# Patient Record
Sex: FEMALE | Race: WHITE | NOT HISPANIC OR LATINO | Employment: OTHER
[De-identification: names, ages, dates, MRNs, and addresses within clinical notes are randomized per-mention and may not be internally consistent; named-entity substitution may affect disease eponyms.]

---

## 2017-05-17 ENCOUNTER — RECORDS - HEALTHEAST (OUTPATIENT)
Dept: ADMINISTRATIVE | Facility: OTHER | Age: 67
End: 2017-05-17

## 2017-05-17 LAB
LAB AP CHARGES (HE HISTORICAL CONVERSION): NORMAL
PATH REPORT.COMMENTS IMP SPEC: NORMAL
PATH REPORT.FINAL DX SPEC: NORMAL
PATH REPORT.GROSS SPEC: NORMAL
PATH REPORT.MICROSCOPIC SPEC OTHER STN: NORMAL
PATH REPORT.RELEVANT HX SPEC: NORMAL
RESULT FLAG (HE HISTORICAL CONVERSION): NORMAL

## 2017-12-18 ENCOUNTER — RECORDS - HEALTHEAST (OUTPATIENT)
Dept: LAB | Facility: CLINIC | Age: 67
End: 2017-12-18

## 2017-12-18 LAB
CHOLEST SERPL-MCNC: 182 MG/DL
FASTING STATUS PATIENT QL REPORTED: YES
HDLC SERPL-MCNC: 70 MG/DL
LDLC SERPL CALC-MCNC: 97 MG/DL
TRIGL SERPL-MCNC: 77 MG/DL

## 2018-10-12 ENCOUNTER — AMBULATORY - HEALTHEAST (OUTPATIENT)
Dept: ONCOLOGY | Facility: CLINIC | Age: 68
End: 2018-10-12

## 2018-10-12 DIAGNOSIS — G89.29 CHRONIC LOW BACK PAIN WITH SCIATICA: ICD-10-CM

## 2018-10-12 DIAGNOSIS — M54.40 CHRONIC LOW BACK PAIN WITH SCIATICA: ICD-10-CM

## 2018-12-26 ENCOUNTER — RECORDS - HEALTHEAST (OUTPATIENT)
Dept: LAB | Facility: CLINIC | Age: 68
End: 2018-12-26

## 2018-12-26 LAB
ANION GAP SERPL CALCULATED.3IONS-SCNC: 6 MMOL/L (ref 5–18)
BUN SERPL-MCNC: 17 MG/DL (ref 8–28)
CALCIUM SERPL-MCNC: 9.7 MG/DL (ref 8.5–10.5)
CHLORIDE BLD-SCNC: 101 MMOL/L (ref 98–107)
CHOLEST SERPL-MCNC: 255 MG/DL
CO2 SERPL-SCNC: 32 MMOL/L (ref 22–31)
CREAT SERPL-MCNC: 0.82 MG/DL (ref 0.6–1.1)
FASTING STATUS PATIENT QL REPORTED: YES
GFR SERPL CREATININE-BSD FRML MDRD: >60 ML/MIN/1.73M2
GLUCOSE BLD-MCNC: 81 MG/DL (ref 70–125)
HDLC SERPL-MCNC: 74 MG/DL
LDLC SERPL CALC-MCNC: 169 MG/DL
POTASSIUM BLD-SCNC: 3.8 MMOL/L (ref 3.5–5)
SODIUM SERPL-SCNC: 139 MMOL/L (ref 136–145)
TRIGL SERPL-MCNC: 61 MG/DL
TSH SERPL DL<=0.005 MIU/L-ACNC: 3.09 UIU/ML (ref 0.3–5)

## 2019-07-02 ENCOUNTER — RECORDS - HEALTHEAST (OUTPATIENT)
Dept: LAB | Facility: CLINIC | Age: 69
End: 2019-07-02

## 2019-07-02 LAB — TSH SERPL DL<=0.005 MIU/L-ACNC: 0.72 UIU/ML (ref 0.3–5)

## 2020-01-03 ENCOUNTER — RECORDS - HEALTHEAST (OUTPATIENT)
Dept: LAB | Facility: CLINIC | Age: 70
End: 2020-01-03

## 2020-01-03 LAB
ANION GAP SERPL CALCULATED.3IONS-SCNC: 10 MMOL/L (ref 5–18)
BUN SERPL-MCNC: 16 MG/DL (ref 8–28)
CALCIUM SERPL-MCNC: 9.7 MG/DL (ref 8.5–10.5)
CHLORIDE BLD-SCNC: 100 MMOL/L (ref 98–107)
CHOLEST SERPL-MCNC: 177 MG/DL
CO2 SERPL-SCNC: 29 MMOL/L (ref 22–31)
CREAT SERPL-MCNC: 0.82 MG/DL (ref 0.6–1.1)
FASTING STATUS PATIENT QL REPORTED: NORMAL
GFR SERPL CREATININE-BSD FRML MDRD: >60 ML/MIN/1.73M2
GLUCOSE BLD-MCNC: 83 MG/DL (ref 70–125)
HDLC SERPL-MCNC: 74 MG/DL
LDLC SERPL CALC-MCNC: 93 MG/DL
POTASSIUM BLD-SCNC: 3.7 MMOL/L (ref 3.5–5)
SODIUM SERPL-SCNC: 139 MMOL/L (ref 136–145)
TRIGL SERPL-MCNC: 49 MG/DL
TSH SERPL DL<=0.005 MIU/L-ACNC: 0.7 UIU/ML (ref 0.3–5)

## 2021-01-04 ENCOUNTER — RECORDS - HEALTHEAST (OUTPATIENT)
Dept: LAB | Facility: CLINIC | Age: 71
End: 2021-01-04

## 2021-01-04 LAB
ANION GAP SERPL CALCULATED.3IONS-SCNC: 12 MMOL/L (ref 5–18)
BUN SERPL-MCNC: 16 MG/DL (ref 8–28)
CALCIUM SERPL-MCNC: 9.5 MG/DL (ref 8.5–10.5)
CHLORIDE BLD-SCNC: 100 MMOL/L (ref 98–107)
CHOLEST SERPL-MCNC: 185 MG/DL
CO2 SERPL-SCNC: 28 MMOL/L (ref 22–31)
CREAT SERPL-MCNC: 0.82 MG/DL (ref 0.6–1.1)
FASTING STATUS PATIENT QL REPORTED: NORMAL
GFR SERPL CREATININE-BSD FRML MDRD: >60 ML/MIN/1.73M2
GLUCOSE BLD-MCNC: 79 MG/DL (ref 70–125)
HDLC SERPL-MCNC: 72 MG/DL
LDLC SERPL CALC-MCNC: 100 MG/DL
POTASSIUM BLD-SCNC: 4.1 MMOL/L (ref 3.5–5)
SODIUM SERPL-SCNC: 140 MMOL/L (ref 136–145)
TRIGL SERPL-MCNC: 65 MG/DL
TSH SERPL DL<=0.005 MIU/L-ACNC: 2.51 UIU/ML (ref 0.3–5)

## 2021-06-21 NOTE — PROGRESS NOTES
"ACUPUNCTURIST TREATMENT NOTE    Name: Fallon Perez  :  1950  MRN:  [id number]      Acupuncture Treatment  Patient Type: [facility]  Intervention Reason: Pain  Pre-session Pain Ratin  Post-session Pain Ratin  Patient complaint:: chronic back pain  Acupuncture (Points):: Du 20, Yin Baca, LI 10, SI 3, LI 4, St 36, GB 34, Fouzia 3, GB 41, BL 62  Practitioner Observed: 30 minute treatment  Checklist: Progress Note Completed;Consent Reveiwed  Follow up comments: Pt. was apprehensive about having acupuncture local at her back today so we started with a distal approach    \"Risks and benefits of acupuncture were discussed with patient. Consent for treatment was given. We thank you for the referral.\"     Cyndi Ponce L.Ac.    Date:  10/12/2018  Time:  5:08 PM    "

## 2022-01-14 ENCOUNTER — LAB REQUISITION (OUTPATIENT)
Dept: LAB | Facility: CLINIC | Age: 72
End: 2022-01-14

## 2022-01-14 DIAGNOSIS — E78.00 PURE HYPERCHOLESTEROLEMIA, UNSPECIFIED: ICD-10-CM

## 2022-01-14 DIAGNOSIS — E03.9 HYPOTHYROIDISM, UNSPECIFIED: ICD-10-CM

## 2022-01-14 DIAGNOSIS — I10 ESSENTIAL (PRIMARY) HYPERTENSION: ICD-10-CM

## 2022-01-14 LAB
ANION GAP SERPL CALCULATED.3IONS-SCNC: 11 MMOL/L (ref 5–18)
BUN SERPL-MCNC: 19 MG/DL (ref 8–28)
CALCIUM SERPL-MCNC: 9.5 MG/DL (ref 8.5–10.5)
CHLORIDE BLD-SCNC: 102 MMOL/L (ref 98–107)
CHOLEST SERPL-MCNC: 186 MG/DL
CO2 SERPL-SCNC: 26 MMOL/L (ref 22–31)
CREAT SERPL-MCNC: 0.98 MG/DL (ref 0.6–1.1)
GFR SERPL CREATININE-BSD FRML MDRD: 61 ML/MIN/1.73M2
GLUCOSE BLD-MCNC: 89 MG/DL (ref 70–125)
HDLC SERPL-MCNC: 81 MG/DL
LDLC SERPL CALC-MCNC: 95 MG/DL
POTASSIUM BLD-SCNC: 3.8 MMOL/L (ref 3.5–5)
SODIUM SERPL-SCNC: 139 MMOL/L (ref 136–145)
TRIGL SERPL-MCNC: 50 MG/DL
TSH SERPL DL<=0.005 MIU/L-ACNC: 1.26 UIU/ML (ref 0.3–5)

## 2022-01-14 PROCEDURE — 82947 ASSAY GLUCOSE BLOOD QUANT: CPT | Performed by: FAMILY MEDICINE

## 2022-01-14 PROCEDURE — 84443 ASSAY THYROID STIM HORMONE: CPT | Performed by: FAMILY MEDICINE

## 2022-01-14 PROCEDURE — 80061 LIPID PANEL: CPT | Performed by: FAMILY MEDICINE

## 2023-01-11 ENCOUNTER — LAB REQUISITION (OUTPATIENT)
Dept: LAB | Facility: CLINIC | Age: 73
End: 2023-01-11

## 2023-01-11 DIAGNOSIS — E03.9 HYPOTHYROIDISM, UNSPECIFIED: ICD-10-CM

## 2023-01-11 DIAGNOSIS — E78.00 PURE HYPERCHOLESTEROLEMIA, UNSPECIFIED: ICD-10-CM

## 2023-01-11 DIAGNOSIS — I10 ESSENTIAL (PRIMARY) HYPERTENSION: ICD-10-CM

## 2023-01-11 PROCEDURE — 84443 ASSAY THYROID STIM HORMONE: CPT | Performed by: FAMILY MEDICINE

## 2023-01-11 PROCEDURE — 80061 LIPID PANEL: CPT | Performed by: FAMILY MEDICINE

## 2023-01-11 PROCEDURE — 80048 BASIC METABOLIC PNL TOTAL CA: CPT | Performed by: FAMILY MEDICINE

## 2023-01-12 LAB
ANION GAP SERPL CALCULATED.3IONS-SCNC: 14 MMOL/L (ref 7–15)
BUN SERPL-MCNC: 13.9 MG/DL (ref 8–23)
CALCIUM SERPL-MCNC: 9.9 MG/DL (ref 8.8–10.2)
CHLORIDE SERPL-SCNC: 98 MMOL/L (ref 98–107)
CHOLEST SERPL-MCNC: 210 MG/DL
CREAT SERPL-MCNC: 0.85 MG/DL (ref 0.51–0.95)
DEPRECATED HCO3 PLAS-SCNC: 27 MMOL/L (ref 22–29)
GFR SERPL CREATININE-BSD FRML MDRD: 71 ML/MIN/1.73M2
GLUCOSE SERPL-MCNC: 71 MG/DL (ref 70–99)
HDLC SERPL-MCNC: 82 MG/DL
LDLC SERPL CALC-MCNC: 119 MG/DL
NONHDLC SERPL-MCNC: 128 MG/DL
POTASSIUM SERPL-SCNC: 3.7 MMOL/L (ref 3.4–5.3)
SODIUM SERPL-SCNC: 139 MMOL/L (ref 136–145)
TRIGL SERPL-MCNC: 47 MG/DL
TSH SERPL DL<=0.005 MIU/L-ACNC: 5.83 UIU/ML (ref 0.3–4.2)

## 2023-03-07 ENCOUNTER — LAB REQUISITION (OUTPATIENT)
Dept: LAB | Facility: CLINIC | Age: 73
End: 2023-03-07

## 2023-03-07 DIAGNOSIS — E03.9 HYPOTHYROIDISM, UNSPECIFIED: ICD-10-CM

## 2023-03-07 LAB
T4 FREE SERPL-MCNC: 1.8 NG/DL (ref 0.9–1.7)
TSH SERPL DL<=0.005 MIU/L-ACNC: 0.18 UIU/ML (ref 0.3–4.2)

## 2023-03-07 PROCEDURE — 84439 ASSAY OF FREE THYROXINE: CPT | Performed by: FAMILY MEDICINE

## 2023-03-07 PROCEDURE — 84443 ASSAY THYROID STIM HORMONE: CPT | Performed by: FAMILY MEDICINE

## 2023-05-15 ENCOUNTER — LAB REQUISITION (OUTPATIENT)
Dept: LAB | Facility: CLINIC | Age: 73
End: 2023-05-15

## 2023-05-15 DIAGNOSIS — E03.9 HYPOTHYROIDISM, UNSPECIFIED: ICD-10-CM

## 2023-05-15 PROCEDURE — 84443 ASSAY THYROID STIM HORMONE: CPT | Performed by: FAMILY MEDICINE

## 2023-05-16 LAB — TSH SERPL DL<=0.005 MIU/L-ACNC: 14.9 UIU/ML (ref 0.3–4.2)

## 2023-07-18 ENCOUNTER — LAB REQUISITION (OUTPATIENT)
Dept: LAB | Facility: CLINIC | Age: 73
End: 2023-07-18

## 2023-07-18 DIAGNOSIS — E03.9 HYPOTHYROIDISM, UNSPECIFIED: ICD-10-CM

## 2023-07-18 LAB — TSH SERPL DL<=0.005 MIU/L-ACNC: 0.05 UIU/ML (ref 0.3–4.2)

## 2023-07-18 PROCEDURE — 84443 ASSAY THYROID STIM HORMONE: CPT | Performed by: FAMILY MEDICINE

## 2023-09-11 ENCOUNTER — LAB REQUISITION (OUTPATIENT)
Dept: LAB | Facility: CLINIC | Age: 73
End: 2023-09-11

## 2023-09-11 DIAGNOSIS — N30.00 ACUTE CYSTITIS WITHOUT HEMATURIA: ICD-10-CM

## 2023-09-11 PROCEDURE — 87086 URINE CULTURE/COLONY COUNT: CPT | Performed by: FAMILY MEDICINE

## 2023-09-13 LAB — BACTERIA UR CULT: NORMAL

## 2023-10-08 ENCOUNTER — APPOINTMENT (OUTPATIENT)
Dept: RADIOLOGY | Facility: HOSPITAL | Age: 73
End: 2023-10-08
Attending: PHYSICIAN ASSISTANT
Payer: COMMERCIAL

## 2023-10-08 ENCOUNTER — HOSPITAL ENCOUNTER (EMERGENCY)
Facility: HOSPITAL | Age: 73
Discharge: HOME | End: 2023-10-08
Attending: EMERGENCY MEDICINE | Admitting: EMERGENCY MEDICINE
Payer: COMMERCIAL

## 2023-10-08 VITALS
WEIGHT: 142 LBS | RESPIRATION RATE: 18 BRPM | BODY MASS INDEX: 25.16 KG/M2 | DIASTOLIC BLOOD PRESSURE: 81 MMHG | OXYGEN SATURATION: 94 % | HEART RATE: 69 BPM | SYSTOLIC BLOOD PRESSURE: 144 MMHG | HEIGHT: 63 IN | TEMPERATURE: 98.4 F

## 2023-10-08 DIAGNOSIS — M25.511 ACUTE PAIN OF RIGHT SHOULDER: ICD-10-CM

## 2023-10-08 PROBLEM — H25.813 COMBINED FORM OF AGE-RELATED CATARACT, BOTH EYES: Status: ACTIVE | Noted: 2023-09-11

## 2023-10-08 PROBLEM — M54.50 LUMBAR PAIN: Status: ACTIVE | Noted: 2021-10-28

## 2023-10-08 PROCEDURE — 73030 X-RAY EXAM OF SHOULDER: CPT | Mod: RT

## 2023-10-08 PROCEDURE — 99283 EMERGENCY DEPT VISIT LOW MDM: CPT

## 2023-10-08 NOTE — DISCHARGE INSTRUCTIONS
You were seen today in the emergency department for right shoulder pain.  Your x-ray of your right shoulder did not show signs of fracture or joint dislocation.  Please follow-up with orthopedics for recheck.  For pain, you can try lidocaine patches, applying ice to the area, and/or taking scheduled ibuprofen and/or Tylenol as described below.    For pain:   - You can take 600 mg of Ibuprofen (Motrin, Advil) by mouth with food every 6 to 8 hours. The maximum dose of ibuprofen is 3200 mg in a 24 hour period. Please do not take more than 3200 mg in 24 hours. Taking this medication with food can help to prevent stomach irritation. With long-term use this medication can irritate the stomach causing pain and can lead to development of a stomach ulcer. If you notice stomach pain or vomiting of coffee-ground colored vomit or blood, please be seen by a healthcare provider. Attempt to use this medication for the shortest time possible.     - You can take 650 to 1000 mg of Acetaminophen (Tylenol) along with the Ibuprofen every 6 to 8 hours.  Please do not take more than 3000 mg in a 24 hour period.  Acetaminophen (Tylenol) is an effective drug when taken at the prescribed dosages, but can cause bodily injury including liver damage if taken too often or at too high of dose. Avoid taking more than 1 acetaminophen-containing product at a time and be aware that many over-the-counter medications contain a combination of acetaminophen or other products. If you are taking Acetaminophen (Tylenol) and another medication containing acetaminophen, please keep track of your total daily amount of acetaminophen that you are taking to make sure you do not take more than the maximum recommended dose. Taking too much acetaminophen can cause permanent damage to your liver.    - Another option is to take either Advil or Tylenol every 3 hours while awake (spacing it out so that you still take each medication every 6 hours). For example, if you  take Tylenol when you get home then you would take Advil 3 hours later followed by another dose of Tylenol 3 hours after that. Another example would be to take Tylenol at 7am, Advil at 10am, Tylenol at 1pm, Advil at 4pm, Tylenol at 7pm, and then Advil at 10pm. Write down the times you are taking both medications to ensure appropriate time in between doses.

## 2023-10-08 NOTE — ED NOTES
I, Melody Ruano have reviewed the documentation, personally taken the patient's history, performed an exam and agree with the physical finds, diagnosis and management plan.    HPI: 73-year-old female here with complaints of pain to the right shoulder after she stretched her arms above her head this morning and felt a pop.  Now complains of pain to the posterior shoulder that is radiating as the day has progressed.  Did not respond to Tylenol prior to arrival.    Physical Exam: No deformity or sprain to the shoulder.  She has reproducible tenderness to palpation to the medial scapular/rhomboid region that extends slightly throughout the shoulder and less the glenoid itself anteriorly.  Her range of motion is actually fairly full albeit with some pain greater than 90 degrees of abduction.    MDM: Overall favor strain.  Clinically less likely to be fracture or dislocation    ED Course/workup: X-ray of the shoulder unremarkable.  Offered muscle relaxers but declines.  Discharged home with reassurance and outpatient Ortho referral if things are not improving in 1 to 2 weeks for PT.      ED Course as of 10/08/23 1613   Sun Oct 08, 2023   1544 XR Shoulder Right G/E 3 Views  Xr independently interpreted by myself without dislocation or fracture    1606 72 y/o f hx of br ca, osteoporosis c/o R shoulder pain this AM stretched arms above her head and had a pop and posterior shoulder pain. As day progress c/o pain radiating down into associated chest/back.  No prev shoulder inj.  Took apap at 0730 without relief.        Final Diagnosis:     ICD-10-CM    1. Acute pain of right shoulder  M25.511             I personally saw the patient and performed a substantive portion of the visit including all aspects of the medical decision making.     MD Alden Foreman Zabrina N, MD  10/08/23 1613

## 2023-10-08 NOTE — ED PROVIDER NOTES
EMERGENCY DEPARTMENT ENCOUNTER      NAME: Fallon Perez  AGE: 73 year old female  YOB: 1950  MRN: [id number]  EVALUATION DATE & TIME: 10/08/23 4:01 PM    PCP: Michael Quevedo    ED PROVIDER: Jessie Ortega PA-C      CHIEF COMPLAINT:  Shoulder Pain      FINAL IMPRESSION:  1. Acute pain of right shoulder          ED COURSE & MEDICAL DECISION MAKING:  Pertinent Labs & Imaging studies reviewed. (See chart for details)    MDM: The patient is a 73 year old female with history of ER positive breast cancer on the right, osteoporosis on bisphosphonates, hypothyroidism who presents to the Emergency Department for evaluation of right shoulder pain onset after she stretched her arms above her head just after waking up this morning at 7 AM.    Initial vitals reviewed and within normal limits. On exam she is resting comfortably in the chair.  She is not toxic nor ill-appearing.  No obvious deformities on examination, no overlying skin changes.  She does have tenderness to palpation at the tip of the right scapula as well as medial portion of the superior border of her scapula with diffuse soft tissue tenderness extending laterally and inferiorly.  She does have some limited range of motion with abduction above 90 degrees secondary to pain, otherwise her range of motion is intact.  Strength and sensation in intact distally.  Capillary refill less than 2 seconds.     Differential diagnosis includes but is not limited to: Shoulder dislocation, muscle strain, bursitis, dislocation, humerus fracture, clavicular fracture, biceps tendinitis, osteoarthritis, AC separation, rheumatoid arthritis, septic joint, thoracic outlet syndrome, brachial plexus neuropathy.    No sign of dislocation, humerus fracture, clavicular fracture, or other bony abnormality on x-ray.  No tenderness over the AC joint or separation on x-ray.  Doubt thoracic outlet or brachial plexus injuries without paresthesias.  Bursitis less likely  given mechanism of injury.    Work up included imaging. XR right shoulder significant for no acute fracture or dislocation. Symptoms and work up most consistent with right shoulder pain, possibly strain. The patient was offered muscle relaxers which she declined.     Plan for discharge to home in stable condition with close follow-up with orthopedics.  Symptomatic management for at home was discussed. The patient expressed understanding and is in agreement with this plan.  Strict return precautions discussed.        Medical Decision Making    History:  Supplemental history from: Documented in chart, if applicable  External Record(s) reviewed: Documented in chart, if applicable.    Work Up:  Chart documentation includes differential considered and any EKGs or imaging independently interpreted by provider, where specified.  In additional to work up documented, I considered the following work up: Documented in chart, if applicable.    External consultation:  Discussion of management with another provider: Documented in chart, if applicable    Complicating factors:  Care impacted by chronic illness: N/A  Care affected by social determinants of health: N/A    Disposition considerations: Discharge. No recommendations on prescription strength medication(s). N/A.      ED COURSE:  3:46 PM I met and introduced myself to the patient. I gathered initial history and performed an initial physical exam. We discussed options and plan for diagnostics and treatment here in the ED. I discussed results of her x-ray as well as anticipated discharge and follow up. Reviewed symptomatic treatment and outpatient follow up.   4:06 PM I have staffed the patient with Dr. Melody Ruano, ED physician, who has evaluated the patient and agrees with all aspects of today's care.   4:09 PM Dr. Ruano discussed discharge, follow up, and reasons to return to the ED. We discussed the plan for discharge and the patient is agreeable. Reviewed reasons to  return to the Emergency Department. Patient to be discharged by ED RN.     At the conclusion of the encounter I discussed the results of all the tests and the disposition. The questions were answered. The patient or family acknowledged understanding and was agreeable with the care plan.      MEDICATIONS GIVEN IN THE EMERGENCY:  Medications - No data to display    NEW PRESCRIPTIONS STARTED AT TODAY'S ER VISIT  New Prescriptions    No medications on file          =================================================================    HPI    Patient information was obtained from: the patient     Use of Intrepreter: N/A        Fallonrosalinda Perez is a 73 year old female with pertinent medical history of ER positive breast cancer on the right, osteoporosis on bisphosphonates, hypothyroidism who presents to the emergency department for evaluation of right shoulder pain.     The patient stretched her arms above her head at 7 AM this morning shortly after waking up and heard a popping noise with immediate onset of pain in her right shoulder.  She took 2 tablets of ibuprofen around 7:30 AM without significant relief.  As the day progressed, her right posterior shoulder pain has now begun to radiate down toward her waist and toward her chest.  She describes her shoulder pain as a burning sensation.  She has not injured this shoulder before.  She does have a remote history of rib fractures, unsure which side.  No shortness of breath currently.  No numbness, tingling, weakness.  She also notes experiencing some chest pressure, which she thinks is due to feeling anxious.  She states that this does not feel like her scar tissue pain that she is experienced previously.        PAST MEDICAL HISTORY:  History reviewed. No pertinent past medical history.    PAST SURGICAL HISTORY:  History reviewed. No pertinent surgical history.    CURRENT MEDICATIONS:    Cannot display prior to admission medications because the patient has not been  "admitted in this contact.       ALLERGIES:  Allergies   Allergen Reactions    Gabapentin Swelling     Lip swelling    Penicillins Hives       FAMILY HISTORY:  History reviewed. No pertinent family history.    SOCIAL HISTORY:        VITALS:    First Vitals:  Patient Vitals for the past 24 hrs:   BP Temp Temp src Pulse Resp SpO2 Height Weight   10/08/23 1416 134/72 98.4  F (36.9  C) Oral 79 20 97 % 1.6 m (5' 3\") 64.4 kg (142 lb)       Patient Vitals for the past 24 hrs:   BP Temp Temp src Pulse Resp SpO2 Height Weight   10/08/23 1416 134/72 98.4  F (36.9  C) Oral 79 20 97 % 1.6 m (5' 3\") 64.4 kg (142 lb)       PHYSICAL EXAM  VITAL SIGNS: /72   Pulse 79   Temp 98.4  F (36.9  C) (Oral)   Resp 20   Ht 1.6 m (5' 3\")   Wt 64.4 kg (142 lb)   SpO2 97%   BMI 25.15 kg/m     Constitutional: Awake, alert, No acute distress. Resting comfortably in chair.   HENT: Normocephalic, Atraumatic. Moving neck freely. No c-spine tenderness.   Respiratory: Breathing easily, clear and equal breath sounds   Cardiovascular: Regular rate and rhythm. Radial pulses are wnl and symmetric. No anterior chest wall tenderness.     Musculoskeletal: Right shoulder: No gross deformities. Appears symmetric when compared to Left. Tenderness to palpation tip of right scapula as well as medial portion of superior border of scapula with diffuse soft tissue tenderness extending laterally and inferiorly. No AC tenderness and no tenderness, crepitus, or deformity of the clavicle. Biceps tendon intact and no tenderness with palpation. Limited ROM with abduction above 90 degrees secondary to pain, otherwise ROM intact. Negative Supraspinatus, Infraspinaturs, Forearm and  strength intact and symmetric.   Integument: Warm, dry. Right shoulder: No overlying skin changes, ecchymosis, abrasions, warmth, or redness.  Neurologic: Alert & oriented x 3, Distal sensation in upper extremities intact and symmetric. Normal speech.  Psychiatric: Affect normal, " Judgment normal, mood normal.          RADIOLOGY/LAB:  Reviewed all pertinent imaging. Please see official radiology report.  All pertinent labs reviewed and interpreted.  Results for orders placed or performed during the hospital encounter of 10/08/23   XR Shoulder Right G/E 3 Views    Impression    IMPRESSION:   1.  No fracture or joint malalignment.  2.  Moderate right glenohumeral degenerative arthrosis.  3.  Mild acromioclavicular arthrosis.  4.  Surgical clips projecting over the right axilla.                  Jessie Ortega PA-C  Emergency Medicine  [facility] EMERGENCY DEPARTMENT  [address] [place] [id number]  [phone]  Dept: [phone]     Jessie Ortega PA-C  10/08/23 8098

## 2023-10-08 NOTE — ED TRIAGE NOTES
"Stretched arms this morning, heard pop behind right shoulder. Now radiating across chest and back. Also reports some sob, thinks could be related to anxiety.      Recent med changes related to thyroid.     Also reports \"unrelated\" Fever of undetermined origin, has CT scan scheduled.         "

## 2023-10-10 ENCOUNTER — LAB REQUISITION (OUTPATIENT)
Dept: LAB | Facility: CLINIC | Age: 73
End: 2023-10-10

## 2023-10-10 DIAGNOSIS — E03.9 HYPOTHYROIDISM, UNSPECIFIED: ICD-10-CM

## 2023-10-10 PROCEDURE — 84443 ASSAY THYROID STIM HORMONE: CPT | Performed by: FAMILY MEDICINE

## 2023-10-11 LAB — TSH SERPL DL<=0.005 MIU/L-ACNC: 10 UIU/ML (ref 0.3–4.2)
